# Patient Record
Sex: FEMALE | Race: BLACK OR AFRICAN AMERICAN | NOT HISPANIC OR LATINO
[De-identification: names, ages, dates, MRNs, and addresses within clinical notes are randomized per-mention and may not be internally consistent; named-entity substitution may affect disease eponyms.]

---

## 2021-06-11 ENCOUNTER — APPOINTMENT (OUTPATIENT)
Dept: RHEUMATOLOGY | Facility: CLINIC | Age: 41
End: 2021-06-11
Payer: MEDICAID

## 2021-06-11 VITALS
HEART RATE: 81 BPM | SYSTOLIC BLOOD PRESSURE: 119 MMHG | TEMPERATURE: 96.6 F | RESPIRATION RATE: 16 BRPM | OXYGEN SATURATION: 97 % | DIASTOLIC BLOOD PRESSURE: 81 MMHG

## 2021-06-11 DIAGNOSIS — M25.561 PAIN IN RIGHT KNEE: ICD-10-CM

## 2021-06-11 DIAGNOSIS — Z84.0 FAMILY HISTORY OF DISEASES OF THE SKIN AND SUBCUTANEOUS TISSUE: ICD-10-CM

## 2021-06-11 DIAGNOSIS — L40.9 PSORIASIS, UNSPECIFIED: ICD-10-CM

## 2021-06-11 DIAGNOSIS — R76.8 OTHER SPECIFIED ABNORMAL IMMUNOLOGICAL FINDINGS IN SERUM: ICD-10-CM

## 2021-06-11 DIAGNOSIS — Z78.9 OTHER SPECIFIED HEALTH STATUS: ICD-10-CM

## 2021-06-11 DIAGNOSIS — M25.562 PAIN IN RIGHT KNEE: ICD-10-CM

## 2021-06-11 DIAGNOSIS — M17.0 BILATERAL PRIMARY OSTEOARTHRITIS OF KNEE: ICD-10-CM

## 2021-06-11 PROBLEM — Z00.00 ENCOUNTER FOR PREVENTIVE HEALTH EXAMINATION: Status: ACTIVE | Noted: 2021-06-11

## 2021-06-11 PROCEDURE — 99204 OFFICE O/P NEW MOD 45 MIN: CPT

## 2021-06-11 PROCEDURE — 99072 ADDL SUPL MATRL&STAF TM PHE: CPT

## 2021-06-11 RX ORDER — SUMATRIPTAN 50 MG/1
50 TABLET, FILM COATED ORAL
Refills: 0 | Status: ACTIVE | COMMUNITY

## 2021-06-11 RX ORDER — METHOCARBAMOL 500 MG/1
500 TABLET, FILM COATED ORAL 3 TIMES DAILY
Qty: 30 | Refills: 2 | Status: ACTIVE | COMMUNITY
Start: 2021-06-11

## 2021-06-11 RX ORDER — NAPROXEN 500 MG/1
500 TABLET ORAL
Refills: 0 | Status: ACTIVE | COMMUNITY

## 2021-06-11 RX ORDER — ERGOCALCIFEROL 1.25 MG/1
1.25 MG CAPSULE, LIQUID FILLED ORAL
Qty: 4 | Refills: 3 | Status: ACTIVE | COMMUNITY
Start: 2021-06-11

## 2021-06-11 NOTE — ASSESSMENT
[FreeTextEntry1] : 40 year-old female with positive BENI 1:640, iron deficiency anemia, knee pain and hair loss with possible psoriasis\par \par D.Dx include, psoriasis, inflammatory arthropathy, SLE (less likely)\par \par add full rheum work-up\par knee x-rays\par \par referral to GI due to daily diarrhea\par \par \par f/u as needed\par no interventions at this time will wait until all results are back

## 2021-06-11 NOTE — PHYSICAL EXAM
[General Appearance - Alert] : alert [General Appearance - In No Acute Distress] : in no acute distress [Neck Appearance] : the appearance of the neck was normal [Neck Cervical Mass (___cm)] : no neck mass was observed [Jugular Venous Distention Increased] : there was no jugular-venous distention [Thyroid Diffuse Enlargement] : the thyroid was not enlarged [Thyroid Nodule] : there were no palpable thyroid nodules [Auscultation Breath Sounds / Voice Sounds] : lungs were clear to auscultation bilaterally [Heart Rate And Rhythm] : heart rate was normal and rhythm regular [Heart Sounds] : normal S1 and S2 [Heart Sounds Gallop] : no gallops [Murmurs] : no murmurs [Heart Sounds Pericardial Friction Rub] : no pericardial rub [Full Pulse] : the pedal pulses are present [Edema] : there was no peripheral edema [Bowel Sounds] : normal bowel sounds [Abdomen Soft] : soft [Abdomen Tenderness] : non-tender [] : no hepato-splenomegaly [Abdomen Mass (___ Cm)] : no abdominal mass palpated [Cervical Lymph Nodes Enlarged Posterior Bilaterally] : posterior cervical [Cervical Lymph Nodes Enlarged Anterior Bilaterally] : anterior cervical [Supraclavicular Lymph Nodes Enlarged Bilaterally] : supraclavicular [FreeTextEntry1] : thinning hair [Oriented To Time, Place, And Person] : oriented to person, place, and time [Impaired Insight] : insight and judgment were intact [Affect] : the affect was normal

## 2021-06-11 NOTE — HISTORY OF PRESENT ILLNESS
[FreeTextEntry1] : regular check up on PCP with positive BENI\par also reports hair loss - with bald spots and thinning\par reports joint pain over the feet mostly over the 1st MTP and knees with cracking - mild pain - with going up the stairs\par left leg swelling at times - uses compression stockings\par hx of bulging discs\par reports scaly rash over the scalp\par Has diarrhea with her first meal daily -\par \par labs with anemia -10.8 and low iron saturation - has heavy periods - taking iron supplements\par BENI 1:640 homogeneous\par \par \par Denies any fevers, chill, rashes, weight loss,fatigue, dry eyes or mouth, mouth sores, Raynaud's. chest pain or SOB, GI or , numbness/tingling\par \par

## 2021-06-11 NOTE — REVIEW OF SYSTEMS
[As Noted in HPI] : as noted in HPI [Joint Pain] : joint pain [Negative] : Heme/Lymph [de-identified] : thinning hair

## 2021-06-16 LAB
C3 SERPL-MCNC: 129 MG/DL
C4 SERPL-MCNC: 28 MG/DL
CCP AB SER IA-ACNC: <8 UNITS
DEPRECATED KAPPA LC FREE/LAMBDA SER: 0.84 RATIO
DSDNA AB SER-ACNC: <12 IU/ML
ENA RNP AB SER IA-ACNC: 0.2 AL
ENA SM AB SER IA-ACNC: <0.2 AL
ENA SS-A AB SER IA-ACNC: <0.2 AL
ENA SS-B AB SER IA-ACNC: <0.2 AL
IGA SER QL IEP: 309 MG/DL
IGG SER QL IEP: 1326 MG/DL
IGM SER QL IEP: 138 MG/DL
KAPPA LC CSF-MCNC: 1.54 MG/DL
KAPPA LC SERPL-MCNC: 1.29 MG/DL
RF+CCP IGG SER-IMP: NEGATIVE
RHEUMATOID FACT SER QL: <10 IU/ML
THYROGLOB AB SERPL-ACNC: <20 IU/ML
THYROPEROXIDASE AB SERPL IA-ACNC: <10 IU/ML

## 2021-07-15 ENCOUNTER — APPOINTMENT (OUTPATIENT)
Dept: GASTROENTEROLOGY | Facility: CLINIC | Age: 41
End: 2021-07-15
Payer: MEDICAID

## 2021-07-15 VITALS
DIASTOLIC BLOOD PRESSURE: 74 MMHG | TEMPERATURE: 97.3 F | WEIGHT: 215 LBS | OXYGEN SATURATION: 100 % | HEART RATE: 63 BPM | BODY MASS INDEX: 31.84 KG/M2 | SYSTOLIC BLOOD PRESSURE: 109 MMHG | HEIGHT: 69 IN

## 2021-07-15 DIAGNOSIS — D64.9 ANEMIA, UNSPECIFIED: ICD-10-CM

## 2021-07-15 PROCEDURE — 99203 OFFICE O/P NEW LOW 30 MIN: CPT

## 2021-07-15 PROCEDURE — 99072 ADDL SUPL MATRL&STAF TM PHE: CPT

## 2021-07-23 ENCOUNTER — OUTPATIENT (OUTPATIENT)
Dept: OUTPATIENT SERVICES | Facility: HOSPITAL | Age: 41
LOS: 1 days | End: 2021-07-23
Payer: MEDICAID

## 2021-07-23 ENCOUNTER — APPOINTMENT (OUTPATIENT)
Dept: MRI IMAGING | Facility: HOSPITAL | Age: 41
End: 2021-07-23
Payer: MEDICAID

## 2021-07-23 DIAGNOSIS — M17.0 BILATERAL PRIMARY OSTEOARTHRITIS OF KNEE: ICD-10-CM

## 2021-07-23 PROCEDURE — 73721 MRI JNT OF LWR EXTRE W/O DYE: CPT

## 2021-07-23 PROCEDURE — 73721 MRI JNT OF LWR EXTRE W/O DYE: CPT | Mod: 26,RT

## 2021-07-25 NOTE — HISTORY OF PRESENT ILLNESS
[de-identified] : This patient is 40 years old and presents with diarrhea constipation and mucus in her stool for 2 years. She also complains of some epigastric pain with some diffuse abdominal pain at times. She has a past medical history of a ruptured gallbladder, and joint pain and anemia with a hemoglobin of 10.8. She takes iron daily. Her periods are regular and last 5 days. She also states that it's heavy with clots. She only drinks one glass of wine per month. There are no GI issues in the family. She's never had a colonoscopy or upper endoscopy. She was born in [place]. She came here at 20 years ago. She uses Naprosyn p.r.n. She has with bowel movements every day. Constipation does not occur often and lasts only 2-3 days

## 2021-07-25 NOTE — ASSESSMENT
[FreeTextEntry1] : This patient has a change in bowel habits but not new. My plan will be\par \par Colonoscopy and also will do upper endoscopy because of abdominal pain\par

## 2021-08-02 ENCOUNTER — APPOINTMENT (OUTPATIENT)
Dept: GASTROENTEROLOGY | Facility: HOSPITAL | Age: 41
End: 2021-08-02

## 2021-08-02 ENCOUNTER — OUTPATIENT (OUTPATIENT)
Dept: OUTPATIENT SERVICES | Facility: HOSPITAL | Age: 41
LOS: 1 days | End: 2021-08-02
Payer: MEDICAID

## 2021-08-02 ENCOUNTER — RESULT REVIEW (OUTPATIENT)
Age: 41
End: 2021-08-02

## 2021-08-02 DIAGNOSIS — D64.9 ANEMIA, UNSPECIFIED: ICD-10-CM

## 2021-08-02 LAB — HCG UR QL: NEGATIVE — SIGNIFICANT CHANGE UP

## 2021-08-02 PROCEDURE — 45378 DIAGNOSTIC COLONOSCOPY: CPT

## 2021-08-02 PROCEDURE — 88305 TISSUE EXAM BY PATHOLOGIST: CPT

## 2021-08-02 PROCEDURE — 81025 URINE PREGNANCY TEST: CPT

## 2021-08-02 PROCEDURE — 43239 EGD BIOPSY SINGLE/MULTIPLE: CPT

## 2021-08-02 PROCEDURE — 88312 SPECIAL STAINS GROUP 1: CPT

## 2021-08-02 PROCEDURE — 45380 COLONOSCOPY AND BIOPSY: CPT

## 2021-08-02 PROCEDURE — 88312 SPECIAL STAINS GROUP 1: CPT | Mod: 26

## 2021-08-02 PROCEDURE — 88305 TISSUE EXAM BY PATHOLOGIST: CPT | Mod: 26

## 2021-08-02 NOTE — PROGRESS NOTE ADULT - SUBJECTIVE AND OBJECTIVE BOX
Colonoscopy Report  Indication: Change in BH  Referring:    Instrument:  [id number]  Anesthesia: MAC  Consent:  Informed consent was obtained from the patient after providing any opportunity for questions  Procedure: After placing the patient in the left lateral decubitus position, the colonoscope was gently inserted into the rectum and advanced to the cecum. Color, texture, mucosa, and anatomy of the colon were carefully examined with the scope. The patient tolerated the procedure well. After completion of the exam, the patient was transferred to the recovery room.     Preparation:  Findings:   Anal Canal	Normal  Rectum	Normal  Sigmoid Colon 	Normal  Descending Colon	Normal  Splenic Flexure	Normal  Transverse Colon	Normal  Hepatic Flexure	Normal  Ascending Colon	 Normal  Cecum	Normal  Ileo-cecal Valve	Normal  Ileum 	Normal  Date and time:   EBL:0    Impression:  Normal colonoscopy     Plan  Colon at age 47 in 7 years                      Procedure Start Time:  10:52  Cecum Reached Time: 10:56  Procedure End Time:  11:02   Total Withdrawal Time:                                 Attending: Alexander Lentz MD        
   Esophagogastroduodenoscopy Report  Indication: Abd pain  Referring MD:   Instrument:  # [id number]  Anesthesia: MAC  Consent:  Informed consent was obtained from the patient after providing any opportunity for questions  Procedure: The gastroscope was gently passed through the incisoral orifice into the oral cavity and under direct visualization the esophagus was intubated. The endoscope was passed down the esophagus, through the stomach and into proximal jejunum. Color, texture, mucosa and anatomy of the esophagus, stomach, and duodenum were carefully examined with the scope. The patient tolerated the procedure well. After completion of the examination, the patient was transferred to the recovery room.   Preparation: NPO   Findings:   Oropharynx	Normal  Esophagus	Normal  EG-junction	Normal  Cardia	Normal.  Body	Normal   Antrum	Normal, bx taken  Pylorus	Normal.  Duodenal Bulb	Normal   2nd portion	Normal  3rd portion	Normal.  Date and time:    EBL:0  Impression: Normal EGD    Plan: 1- F/U biopsy                              Procedure Start Time: 10:45  Procedure End Time:   10:48                                  Attending:   Alexander Lentz MD

## 2021-08-04 LAB — SURGICAL PATHOLOGY STUDY: SIGNIFICANT CHANGE UP

## 2021-08-18 DIAGNOSIS — M94.269 CHONDROMALACIA, UNSPECIFIED KNEE: ICD-10-CM

## 2021-08-19 ENCOUNTER — APPOINTMENT (OUTPATIENT)
Dept: GASTROENTEROLOGY | Facility: CLINIC | Age: 41
End: 2021-08-19
Payer: MEDICAID

## 2021-08-19 VITALS
TEMPERATURE: 97.1 F | SYSTOLIC BLOOD PRESSURE: 107 MMHG | WEIGHT: 217 LBS | HEART RATE: 64 BPM | OXYGEN SATURATION: 100 % | BODY MASS INDEX: 32.05 KG/M2 | DIASTOLIC BLOOD PRESSURE: 68 MMHG

## 2021-08-19 PROCEDURE — 99213 OFFICE O/P EST LOW 20 MIN: CPT

## 2021-08-19 RX ORDER — DICYCLOMINE HYDROCHLORIDE 10 MG/1
10 CAPSULE ORAL EVERY 6 HOURS
Qty: 360 | Refills: 3 | Status: ACTIVE | COMMUNITY
Start: 2021-08-19 | End: 1900-01-01

## 2021-08-22 NOTE — ASSESSMENT
[FreeTextEntry1] : This patient has symptoms of IBS. My plan will be\par \par Dicyclomine 10 mg Q. a.c. and q.h.s.\par Bring back in one month

## 2021-08-22 NOTE — PHYSICAL EXAM
[General Appearance - Alert] : alert [General Appearance - In No Acute Distress] : in no acute distress [Sclera] : the sclera and conjunctiva were normal [PERRL With Normal Accommodation] : pupils were equal in size, round, and reactive to light [Extraocular Movements] : extraocular movements were intact [Outer Ear] : the ears and nose were normal in appearance [Oropharynx] : the oropharynx was normal [Neck Appearance] : the appearance of the neck was normal [Neck Cervical Mass (___cm)] : no neck mass was observed [Jugular Venous Distention Increased] : there was no jugular-venous distention [Thyroid Diffuse Enlargement] : the thyroid was not enlarged [Thyroid Nodule] : there were no palpable thyroid nodules [Auscultation Breath Sounds / Voice Sounds] : lungs were clear to auscultation bilaterally [Heart Rate And Rhythm] : heart rate was normal and rhythm regular [Heart Sounds] : normal S1 and S2 [Heart Sounds Gallop] : no gallops [Murmurs] : no murmurs [Heart Sounds Pericardial Friction Rub] : no pericardial rub [Full Pulse] : the pedal pulses are present [Edema] : there was no peripheral edema [Bowel Sounds] : normal bowel sounds [Abdomen Tenderness] : non-tender [Abdomen Soft] : soft [] : no hepato-splenomegaly [Abdomen Mass (___ Cm)] : no abdominal mass palpated

## 2021-08-22 NOTE — HISTORY OF PRESENT ILLNESS
[de-identified] : This patient had an EGD and colonoscopy done. H. pylori was negative. Continues to have constipation alternating with diarrhea. Had cholecystectomy in the past. No other changes. Diarrhea was associated with cramps like seen in IBS

## 2021-09-09 ENCOUNTER — APPOINTMENT (OUTPATIENT)
Dept: GASTROENTEROLOGY | Facility: CLINIC | Age: 41
End: 2021-09-09
Payer: MEDICAID

## 2021-09-09 VITALS
DIASTOLIC BLOOD PRESSURE: 75 MMHG | OXYGEN SATURATION: 99 % | HEART RATE: 78 BPM | SYSTOLIC BLOOD PRESSURE: 108 MMHG | HEIGHT: 69 IN | TEMPERATURE: 97.1 F | BODY MASS INDEX: 31.84 KG/M2 | WEIGHT: 215 LBS

## 2021-09-09 DIAGNOSIS — K52.9 NONINFECTIVE GASTROENTERITIS AND COLITIS, UNSPECIFIED: ICD-10-CM

## 2021-09-09 PROCEDURE — 99213 OFFICE O/P EST LOW 20 MIN: CPT

## 2021-09-09 NOTE — ASSESSMENT
[FreeTextEntry1] : Patient with Likely IBS -M type symptoms\par \par \par Plan\par Use dicyclomine as she sees fit\par Continue Iron for 6 months

## 2021-09-09 NOTE — PHYSICAL EXAM
[General Appearance - Alert] : alert [General Appearance - In No Acute Distress] : in no acute distress [Sclera] : the sclera and conjunctiva were normal [PERRL With Normal Accommodation] : pupils were equal in size, round, and reactive to light [Extraocular Movements] : extraocular movements were intact [Oropharynx] : the oropharynx was normal [Outer Ear] : the ears and nose were normal in appearance [Neck Appearance] : the appearance of the neck was normal [Neck Cervical Mass (___cm)] : no neck mass was observed [Jugular Venous Distention Increased] : there was no jugular-venous distention [Thyroid Diffuse Enlargement] : the thyroid was not enlarged [Thyroid Nodule] : there were no palpable thyroid nodules [Auscultation Breath Sounds / Voice Sounds] : lungs were clear to auscultation bilaterally [Heart Rate And Rhythm] : heart rate was normal and rhythm regular [Heart Sounds] : normal S1 and S2 [Heart Sounds Gallop] : no gallops [Murmurs] : no murmurs [Heart Sounds Pericardial Friction Rub] : no pericardial rub [Full Pulse] : the pedal pulses are present [Edema] : there was no peripheral edema [Bowel Sounds] : normal bowel sounds [Abdomen Soft] : soft [Abdomen Tenderness] : non-tender [] : no hepato-splenomegaly [Abdomen Mass (___ Cm)] : no abdominal mass palpated

## 2021-09-09 NOTE — HISTORY OF PRESENT ILLNESS
[de-identified] : On Dicyclomine and using at lunch and bedtime. feels better. Iron iron and fiber. Some constipation given her new meds. Seems satisfied. Colon and EGD basically negative